# Patient Record
Sex: MALE | URBAN - METROPOLITAN AREA
[De-identification: names, ages, dates, MRNs, and addresses within clinical notes are randomized per-mention and may not be internally consistent; named-entity substitution may affect disease eponyms.]

---

## 2018-07-09 ENCOUNTER — IMPORTED ENCOUNTER (OUTPATIENT)
Dept: URBAN - METROPOLITAN AREA CLINIC 9 | Facility: CLINIC | Age: 48
End: 2018-07-09

## 2018-07-12 ENCOUNTER — IMPORTED ENCOUNTER (OUTPATIENT)
Dept: URBAN - METROPOLITAN AREA CLINIC 9 | Facility: CLINIC | Age: 48
End: 2018-07-12

## 2018-07-24 ENCOUNTER — IMPORTED ENCOUNTER (OUTPATIENT)
Dept: URBAN - METROPOLITAN AREA CLINIC 9 | Facility: CLINIC | Age: 48
End: 2018-07-24

## 2018-07-30 ENCOUNTER — IMPORTED ENCOUNTER (OUTPATIENT)
Dept: URBAN - METROPOLITAN AREA CLINIC 9 | Facility: CLINIC | Age: 48
End: 2018-07-30

## 2018-12-06 NOTE — PATIENT DISCUSSION
MYOPIA/ASTIGMATISM, OU: PRESCRIBED GLASSES. SMALL RX CHANGE. NO INTEREST IN CONTACT LENSES AT THIS TIME. FOLLOW-UP AS SCHEDULED.

## 2021-10-16 ASSESSMENT — VISUAL ACUITY
OS_CC: 20/20 SN
OS_CC: 20/20 SN
OD_SC: 20/50 SN
OD_CC: 20/20 SN
OD_CC: 20/20 SN
OD_SC: 20/60 SN
OS_SC: 20/50 SN
OS_SC: 20/50 SN

## 2021-10-16 ASSESSMENT — KERATOMETRY
OD_K1POWER_DIOPTERS: 43
OD_AXISANGLE_DEGREES: 11
OD_AXISANGLE2_DEGREES: 101
OS_AXISANGLE2_DEGREES: 84
OS_AXISANGLE_DEGREES: 174
OS_K2POWER_DIOPTERS: 43.5
OS_K1POWER_DIOPTERS: 43
OD_K2POWER_DIOPTERS: 43.75

## 2021-10-16 ASSESSMENT — TONOMETRY
OS_IOP_MMHG: 16
OD_IOP_MMHG: 18